# Patient Record
Sex: FEMALE
[De-identification: names, ages, dates, MRNs, and addresses within clinical notes are randomized per-mention and may not be internally consistent; named-entity substitution may affect disease eponyms.]

---

## 2018-02-12 ENCOUNTER — TRANSCRIPTION ENCOUNTER (OUTPATIENT)
Age: 38
End: 2018-02-12

## 2021-07-22 ENCOUNTER — TRANSCRIPTION ENCOUNTER (OUTPATIENT)
Age: 41
End: 2021-07-22

## 2021-08-01 ENCOUNTER — TRANSCRIPTION ENCOUNTER (OUTPATIENT)
Age: 41
End: 2021-08-01

## 2021-08-20 ENCOUNTER — TRANSCRIPTION ENCOUNTER (OUTPATIENT)
Age: 41
End: 2021-08-20

## 2021-12-31 ENCOUNTER — TRANSCRIPTION ENCOUNTER (OUTPATIENT)
Age: 41
End: 2021-12-31

## 2022-03-27 ENCOUNTER — INPATIENT (INPATIENT)
Facility: HOSPITAL | Age: 42
LOS: 0 days | Discharge: HOME | DRG: 176 | End: 2022-03-28
Attending: HOSPITALIST | Admitting: HOSPITALIST
Payer: COMMERCIAL

## 2022-03-27 ENCOUNTER — TRANSCRIPTION ENCOUNTER (OUTPATIENT)
Age: 42
End: 2022-03-27

## 2022-03-27 VITALS
HEART RATE: 104 BPM | OXYGEN SATURATION: 99 % | HEIGHT: 68 IN | WEIGHT: 158.73 LBS | DIASTOLIC BLOOD PRESSURE: 67 MMHG | TEMPERATURE: 98 F | RESPIRATION RATE: 24 BRPM | SYSTOLIC BLOOD PRESSURE: 139 MMHG

## 2022-03-27 DIAGNOSIS — Z90.49 ACQUIRED ABSENCE OF OTHER SPECIFIED PARTS OF DIGESTIVE TRACT: Chronic | ICD-10-CM

## 2022-03-27 DIAGNOSIS — I26.99 OTHER PULMONARY EMBOLISM WITHOUT ACUTE COR PULMONALE: ICD-10-CM

## 2022-03-27 LAB
ALBUMIN SERPL ELPH-MCNC: 3.4 G/DL — LOW (ref 3.5–5)
ALP SERPL-CCNC: 64 U/L — SIGNIFICANT CHANGE UP (ref 40–120)
ALT FLD-CCNC: 19 U/L DA — SIGNIFICANT CHANGE UP (ref 10–60)
AMPHET UR-MCNC: NEGATIVE — SIGNIFICANT CHANGE UP
ANION GAP SERPL CALC-SCNC: 5 MMOL/L — SIGNIFICANT CHANGE UP (ref 5–17)
APPEARANCE UR: CLEAR — SIGNIFICANT CHANGE UP
AST SERPL-CCNC: 17 U/L — SIGNIFICANT CHANGE UP (ref 10–40)
BACTERIA # UR AUTO: ABNORMAL /HPF
BARBITURATES UR SCN-MCNC: NEGATIVE — SIGNIFICANT CHANGE UP
BASOPHILS # BLD AUTO: 0.02 K/UL — SIGNIFICANT CHANGE UP (ref 0–0.2)
BASOPHILS NFR BLD AUTO: 0.5 % — SIGNIFICANT CHANGE UP (ref 0–2)
BENZODIAZ UR-MCNC: NEGATIVE — SIGNIFICANT CHANGE UP
BILIRUB SERPL-MCNC: 0.6 MG/DL — SIGNIFICANT CHANGE UP (ref 0.2–1.2)
BILIRUB UR-MCNC: NEGATIVE — SIGNIFICANT CHANGE UP
BUN SERPL-MCNC: 8 MG/DL — SIGNIFICANT CHANGE UP (ref 7–18)
CALCIUM SERPL-MCNC: 9 MG/DL — SIGNIFICANT CHANGE UP (ref 8.4–10.5)
CHLORIDE SERPL-SCNC: 105 MMOL/L — SIGNIFICANT CHANGE UP (ref 96–108)
CK SERPL-CCNC: 35 U/L — SIGNIFICANT CHANGE UP (ref 21–215)
CO2 SERPL-SCNC: 26 MMOL/L — SIGNIFICANT CHANGE UP (ref 22–31)
COCAINE METAB.OTHER UR-MCNC: NEGATIVE — SIGNIFICANT CHANGE UP
COLOR SPEC: YELLOW — SIGNIFICANT CHANGE UP
CREAT SERPL-MCNC: 0.78 MG/DL — SIGNIFICANT CHANGE UP (ref 0.5–1.3)
D DIMER BLD IA.RAPID-MCNC: 1057 NG/ML DDU — HIGH
DIFF PNL FLD: ABNORMAL
EGFR: 98 ML/MIN/1.73M2 — SIGNIFICANT CHANGE UP
EOSINOPHIL # BLD AUTO: 0.11 K/UL — SIGNIFICANT CHANGE UP (ref 0–0.5)
EOSINOPHIL NFR BLD AUTO: 2.5 % — SIGNIFICANT CHANGE UP (ref 0–6)
EPI CELLS # UR: ABNORMAL /HPF
GLUCOSE SERPL-MCNC: 89 MG/DL — SIGNIFICANT CHANGE UP (ref 70–99)
GLUCOSE UR QL: NEGATIVE — SIGNIFICANT CHANGE UP
HCG SERPL-ACNC: <1 MIU/ML — SIGNIFICANT CHANGE UP
HCT VFR BLD CALC: 35.6 % — SIGNIFICANT CHANGE UP (ref 34.5–45)
HGB BLD-MCNC: 12 G/DL — SIGNIFICANT CHANGE UP (ref 11.5–15.5)
IMM GRANULOCYTES NFR BLD AUTO: 0.2 % — SIGNIFICANT CHANGE UP (ref 0–1.5)
KETONES UR-MCNC: NEGATIVE — SIGNIFICANT CHANGE UP
LEUKOCYTE ESTERASE UR-ACNC: NEGATIVE — SIGNIFICANT CHANGE UP
LYMPHOCYTES # BLD AUTO: 1.15 K/UL — SIGNIFICANT CHANGE UP (ref 1–3.3)
LYMPHOCYTES # BLD AUTO: 26 % — SIGNIFICANT CHANGE UP (ref 13–44)
MCHC RBC-ENTMCNC: 29.6 PG — SIGNIFICANT CHANGE UP (ref 27–34)
MCHC RBC-ENTMCNC: 33.7 GM/DL — SIGNIFICANT CHANGE UP (ref 32–36)
MCV RBC AUTO: 87.9 FL — SIGNIFICANT CHANGE UP (ref 80–100)
METHADONE UR-MCNC: NEGATIVE — SIGNIFICANT CHANGE UP
MONOCYTES # BLD AUTO: 0.33 K/UL — SIGNIFICANT CHANGE UP (ref 0–0.9)
MONOCYTES NFR BLD AUTO: 7.4 % — SIGNIFICANT CHANGE UP (ref 2–14)
NEUTROPHILS # BLD AUTO: 2.81 K/UL — SIGNIFICANT CHANGE UP (ref 1.8–7.4)
NEUTROPHILS NFR BLD AUTO: 63.4 % — SIGNIFICANT CHANGE UP (ref 43–77)
NITRITE UR-MCNC: POSITIVE
NRBC # BLD: 0 /100 WBCS — SIGNIFICANT CHANGE UP (ref 0–0)
OPIATES UR-MCNC: NEGATIVE — SIGNIFICANT CHANGE UP
PCP SPEC-MCNC: SIGNIFICANT CHANGE UP
PCP UR-MCNC: NEGATIVE — SIGNIFICANT CHANGE UP
PH UR: 6 — SIGNIFICANT CHANGE UP (ref 5–8)
PLATELET # BLD AUTO: 172 K/UL — SIGNIFICANT CHANGE UP (ref 150–400)
POTASSIUM SERPL-MCNC: 3.7 MMOL/L — SIGNIFICANT CHANGE UP (ref 3.5–5.3)
POTASSIUM SERPL-SCNC: 3.7 MMOL/L — SIGNIFICANT CHANGE UP (ref 3.5–5.3)
PROT SERPL-MCNC: 8 G/DL — SIGNIFICANT CHANGE UP (ref 6–8.3)
PROT UR-MCNC: NEGATIVE — SIGNIFICANT CHANGE UP
RBC # BLD: 4.05 M/UL — SIGNIFICANT CHANGE UP (ref 3.8–5.2)
RBC # FLD: 12 % — SIGNIFICANT CHANGE UP (ref 10.3–14.5)
RBC CASTS # UR COMP ASSIST: ABNORMAL /HPF (ref 0–2)
SARS-COV-2 RNA SPEC QL NAA+PROBE: SIGNIFICANT CHANGE UP
SODIUM SERPL-SCNC: 136 MMOL/L — SIGNIFICANT CHANGE UP (ref 135–145)
SP GR SPEC: 1.01 — SIGNIFICANT CHANGE UP (ref 1.01–1.02)
T4 AB SER-ACNC: 15.1 UG/DL — HIGH (ref 4.6–12)
THC UR QL: NEGATIVE — SIGNIFICANT CHANGE UP
TROPONIN I, HIGH SENSITIVITY RESULT: <3 NG/L — SIGNIFICANT CHANGE UP
TSH SERPL-MCNC: 1.53 UU/ML — SIGNIFICANT CHANGE UP (ref 0.34–4.82)
UROBILINOGEN FLD QL: NEGATIVE — SIGNIFICANT CHANGE UP
WBC # BLD: 4.43 K/UL — SIGNIFICANT CHANGE UP (ref 3.8–10.5)
WBC # FLD AUTO: 4.43 K/UL — SIGNIFICANT CHANGE UP (ref 3.8–10.5)
WBC UR QL: SIGNIFICANT CHANGE UP /HPF (ref 0–5)

## 2022-03-27 PROCEDURE — 99285 EMERGENCY DEPT VISIT HI MDM: CPT

## 2022-03-27 PROCEDURE — 99223 1ST HOSP IP/OBS HIGH 75: CPT

## 2022-03-27 PROCEDURE — 71275 CT ANGIOGRAPHY CHEST: CPT | Mod: 26,MA

## 2022-03-27 PROCEDURE — 71045 X-RAY EXAM CHEST 1 VIEW: CPT | Mod: 26

## 2022-03-27 RX ORDER — ENOXAPARIN SODIUM 100 MG/ML
80 INJECTION SUBCUTANEOUS ONCE
Refills: 0 | Status: COMPLETED | OUTPATIENT
Start: 2022-03-27 | End: 2022-03-27

## 2022-03-27 RX ORDER — ENOXAPARIN SODIUM 100 MG/ML
80 INJECTION SUBCUTANEOUS EVERY 12 HOURS
Refills: 0 | Status: DISCONTINUED | OUTPATIENT
Start: 2022-03-27 | End: 2022-03-27

## 2022-03-27 RX ORDER — SODIUM CHLORIDE 9 MG/ML
1000 INJECTION INTRAMUSCULAR; INTRAVENOUS; SUBCUTANEOUS
Refills: 0 | Status: DISCONTINUED | OUTPATIENT
Start: 2022-03-27 | End: 2022-03-27

## 2022-03-27 RX ORDER — CEFTRIAXONE 500 MG/1
1000 INJECTION, POWDER, FOR SOLUTION INTRAMUSCULAR; INTRAVENOUS ONCE
Refills: 0 | Status: COMPLETED | OUTPATIENT
Start: 2022-03-27 | End: 2022-03-27

## 2022-03-27 RX ORDER — KETOROLAC TROMETHAMINE 30 MG/ML
30 SYRINGE (ML) INJECTION ONCE
Refills: 0 | Status: DISCONTINUED | OUTPATIENT
Start: 2022-03-27 | End: 2022-03-27

## 2022-03-27 RX ADMIN — ENOXAPARIN SODIUM 80 MILLIGRAM(S): 100 INJECTION SUBCUTANEOUS at 22:17

## 2022-03-27 RX ADMIN — CEFTRIAXONE 100 MILLIGRAM(S): 500 INJECTION, POWDER, FOR SOLUTION INTRAMUSCULAR; INTRAVENOUS at 22:17

## 2022-03-27 RX ADMIN — Medication 30 MILLIGRAM(S): at 18:29

## 2022-03-27 RX ADMIN — SODIUM CHLORIDE 200 MILLILITER(S): 9 INJECTION INTRAMUSCULAR; INTRAVENOUS; SUBCUTANEOUS at 22:17

## 2022-03-27 NOTE — ED ADULT NURSE NOTE - OBJECTIVE STATEMENT
patient AOx3, calm, cooperative, in no acute distress, reports intermittent shortness of breath, palpitations, and right side neck and flank pain. No chest pain at this time, no difficulty breathing.

## 2022-03-27 NOTE — H&P ADULT - NSHPPHYSICALEXAM_GEN_ALL_CORE
Vital Signs Last 24 Hrs  T(C): 36.8 (27 Mar 2022 19:22), Max: 36.8 (27 Mar 2022 19:22)  T(F): 98.2 (27 Mar 2022 19:22), Max: 98.2 (27 Mar 2022 19:22)  HR: 79 (27 Mar 2022 19:22) (79 - 104)  BP: 125/77 (27 Mar 2022 19:22) (125/77 - 139/67)  BP(mean): --  RR: 18 (27 Mar 2022 19:22) (18 - 24)  SpO2: 100% (27 Mar 2022 19:22) (99% - 100%)    GENERAL: NAD, speaks in full sentences, becomes SOB when speaking  HEAD:  Atraumatic, Normocephalic  EYES: EOMI, PERRLA, conjunctiva and sclera clear  NECK: Supple, No JVD  CHEST/LUNG: Pt seen holding her right side,. mildly decreased breath sounds on right  HEART: Regular rate and rhythm; No murmurs;   ABDOMEN: Soft, Nontender, Nondistended; Bowel sounds present; No guarding  EXTREMITIES:  2+ Peripheral Pulses, No cyanosis or edema  PSYCH:  Normal Affect  NEUROLOGY: non-focal, AAO X 3. Strength is 5/5. no sensory loss.  SKIN: No rashes or lesions

## 2022-03-27 NOTE — ED PROVIDER NOTE - CLINICAL SUMMARY MEDICAL DECISION MAKING FREE TEXT BOX
pt with Rt sided back pain, coughing, sob, concern for COVID, also PE, FH of coagulopathy, also pt's on BCP, will get labs, d-dimer, unlikely CAD.

## 2022-03-27 NOTE — ED PROVIDER NOTE - CONSTITUTIONAL DISTRESS
Patient has a concern , she is having chemo tomorrow and she wondering if her levels are good for her to have the treatment MILD

## 2022-03-27 NOTE — H&P ADULT - PROBLEM SELECTOR PLAN 1
Pt p/w SOB, chest pain, and cough for 4 days  Pt had COVID in december with only mild symptoms  Father and sister got blood clot from COVID vaccine  Pt on combined estrogen/progesteron OCP  No hx of recent surgery or cancer  CTA showed b/l subsegmental pulmonary embolism, RLL infarct  No sign of right heart strain  Pt stable on RA  EKG NSR @ 84 bpm    Admit to tele  f/u echo to check for right heart strain  FD lovenox for now, transition to DOAC prior to discharge  Tylenol for pain  Patient counseled on alternate forms of birth control  Primary with family hx of VTE after COVID vaccine, primary team to consider hypercoagulable workup  Cardio consult, Dr. Jung Pt p/w SOB, chest pain, and cough for 4 days  Pt had COVID in december with only mild symptoms  Father and sister got blood clot from COVID vaccine  Pt on combined estrogen/progesteron OCP  No hx of recent surgery or cancer  CTA showed b/l subsegmental pulmonary embolism, RLL infarct  No sign of right heart strain  Pt stable on RA  EKG NSR @ 84 bpm    Admit to tele  f/u echo to check for right heart strain  FD lovenox for now, transition to DOAC prior to discharge  Tylenol for pain  Patient counseled on alternate forms of birth control  Primary with family hx of VTE after COVID vaccine, primary team to consider hypercoagulable workup  QMA consulted  Cardio consult, Dr. Jung Pt p/w SOB, chest pain, and cough for 4 days  Pt had COVID in december with only mild symptoms  Father and sister got blood clot from COVID vaccine  Pt on combined estrogen/progesteron OCP  No hx of recent surgery or cancer  CTA showed b/l subsegmental pulmonary embolism, RLL infarct  No sign of right heart strain  Pt stable on RA  EKG NSR @ 84 bpm    Admit to tele  f/u echo to check for right heart strain  FD lovenox for now, transition to DOAC prior to discharge  Tylenol for pain  Patient counseled on alternate forms of birth control  Family hx of VTE after COVID vaccine, primary team to consider hypercoagulable workup  QMA consulted  Cardio consult, Dr. Jung Pt p/w SOB, chest pain, and cough for 4 days  Pt had COVID in december with only mild symptoms  Father and sister got blood clot from COVID vaccine  Pt on combined estrogen/progesteron OCP  No hx of recent surgery or cancer  CTA showed b/l subsegmental pulmonary embolism, RLL infarct  No sign of right heart strain  Pt stable on RA  EKG NSR @ 84 bpm    Admit to tele  f/u echo to check for right heart strain  FD lovenox for now, transition to DOAC prior to discharge  Patient counseled on alternate forms of birth control  Family hx of VTE after COVID vaccine, primary team to consider hypercoagulable workup  QMA consulted  Cardio consult, Dr. Jung Pt p/w SOB, chest pain, and cough for 4 days  Pt had COVID in december with only mild symptoms  Father and sister got blood clot from COVID vaccine  Pt on combined estrogen/progesteron OCP  No hx of recent surgery or cancer  CTA showed b/l subsegmental pulmonary embolism, RLL infarct  No sign of right heart strain  Pt stable on RA  EKG NSR @ 84 bpm    Admit to tele  f/u echo to check for right heart strain  FD lovenox for now, transition to DOAC prior to discharge  Patient counseled on alternate forms of birth control  Family hx of VTE after COVID vaccine, primary team to consider hypercoagulable workup  QMA consulted  Cardio consult, Dr. Correa Pt p/w SOB, chest pain, and cough for 4 days  Pt had COVID in december with only mild symptoms  Father and sister got blood clot from COVID vaccine ? ( might have family hx of clotting disorder)  Pt on combined estrogen/progesteron OCP  No hx of recent surgery or cancer  CTA showed b/l subsegmental pulmonary embolism, RLL infarct  No sign of right heart strain  Pt stable on RA  EKG NSR @ 84 bpm  Admit to tele  f/u echo to check for right heart strain  FD lovenox for now, transition to DOAC prior to discharge  Patient counseled on alternate forms of birth control  Family hx of VTE after COVID vaccine, primary team to consider hypercoagulable workup  QMA consulted  Cardio consult, Dr. Correa Pt p/w SOB, chest pain, and cough for 4 days  Pt had COVID in december with only mild symptoms  Father and sister got blood clot from COVID vaccine ? ( might have family hx of clotting disorder)  Pt on combined estrogen/progesteron OCP  No hx of recent surgery or cancer  CTA showed b/l subsegmental pulmonary embolism, RLL infarct  No sign of right heart strain  Pt stable on RA  EKG NSR @ 84 bpm    f/u echo to check for right heart strain  FD lovenox for now, transition to DOAC prior to discharge  Patient counseled on alternate forms of birth control  Family hx of VTE after COVID vaccine, primary team to consider hypercoagulable workup  QMA consulted

## 2022-03-27 NOTE — H&P ADULT - HISTORY OF PRESENT ILLNESS
Pt is a 41 y.o. female with PMH of PCOS, and PSHx of appendectomy and cholecystectomy is presenting with 4 days of shortness of breath, chest pain and cough. She describes right sided chest pain and right sided back pain, worse when laying flat and taking a deep breath. She said her oxygen levels was 95% and her HR was over 100. She is unvaccinated, but had COVID 12/21 which did not require hospitalization. She takes birth control pills. Claims her father and sister have history of blood clots.    In ED: D-dimer 1057, Trop negative, EKG, CTA showed bilateral lower lobe and lingular subsegmental pulmonary emboli, 99% on RA   Pt is a 41 y.o. female with PMH of PCOS, and PSHx of appendectomy and cholecystectomy is presenting with 4 days of shortness of breath, chest pain and cough. She describes right sided chest pain and right sided back pain which is worse on exertion, when laying flat and on inspiration. She said at home her O2 level was 95% and her HR was over 100. She is unvaccinated, but had COVID in 12/21 which did not require hospitalization. She takes combined birth control pills. Claims her father and sister have history of blood clots after getting COVID vaccine.    In ED: D-dimer 1057, Trop negative, EKG shows NSR, CTA showed bilateral lower lobe and lingular subsegmental pulmonary emboli and RLL infarct, 99% on RA   Pt is a 41 y.o. female with PMH of PCOS, and PSHx of appendectomy and cholecystectomy is presenting with 4 days of shortness of breath, chest pain and cough. She describes right sided chest pain and right sided back pain which is worse on exertion, when laying flat and on inspiration. She said at home her O2 level was 95% and her HR was over 100. She is unvaccinated, but had COVID in 12/21 which did not require hospitalization. She takes combined birth control pills. Claims her father and sister have history of blood clots after getting COVID vaccine.    In ED: D-dimer 1057, Trop negative, EKG shows NSR, CTA showed bilateral lower lobe and lingular subsegmental pulmonary emboli and RLL infarct, 99% on RA    Pharmacy: [id number] [facility]  No PCP

## 2022-03-27 NOTE — ED PROVIDER NOTE - CARE PLAN
Principal Discharge DX:	Pulmonary embolism  Secondary Diagnosis:	Pulmonary infarct  Secondary Diagnosis:	Acute pyelonephritis   1

## 2022-03-27 NOTE — H&P ADULT - PROBLEM SELECTOR PLAN 2
Pt with hx of PCOS  c/w spironolactone Pt with hx of PCOS  Hold spironolactone for now, can resume Pt with hx of PCOS  Hold spironolactone for now, can resume once blood pressure is better.

## 2022-03-27 NOTE — H&P ADULT - ATTENDING COMMENTS
Pt seen and examined.  Case discussed with MAR.  41 year old woman with PMH of PCOS on OCP who presents with 4 days of intermittent SOB, chest tightness and pleuritic chest pain, dry cough all worse with exertion.     Vital Signs Last 24 Hrs  T(C): 36.8 (27 Mar 2022 19:22), Max: 36.8 (27 Mar 2022 19:22)  T(F): 98.2 (27 Mar 2022 19:22), Max: 98.2 (27 Mar 2022 19:22)  HR: 79 (27 Mar 2022 19:22) (79 - 104)  BP: 125/77 (27 Mar 2022 19:22) (125/77 - 139/67)  RR: 18 (27 Mar 2022 19:22) (18 - 24)  SpO2: 100% (27 Mar 2022 19:22) (99% - 100%)                          12.0   4.43  )-----------( 172      ( 27 Mar 2022 17:06 )             35.6     D-dimer - 1057    03-27    136  |  105  |  8   ---------------------<  89  3.7   |  26  |  0.78    Ca    9.0      27 Mar 2022 17:06    TPro  8.0  /  Alb  3.4<L>  /  TBili  0.6  /  DBili  x   /  AST  17  /  ALT  19  /  AlkPhos  64  03-27    T4 - 15.1    CTA chest   Right lower lobe dependent ground-glass opacity compatible with a pulmonary infarct.  VESSELS: Right lower lobar through subsegmental pulmonary emboli.   Lingular and left lower lobar through subsegmental pulmonary emboli..      Impression  - Acute B/L pulmonary embolism - subsegmental    Provoked likely from OCP use but recent incidence of venous thromboembolism in first degree relatives suggest a need for a thrombophilia work up    Plan   - Admit to medicine   - Full dose anticoagulation with LMWH -Lovenox 1mg/kg q 12 hourly   - ECHO 2D  - Change to oral anticoagulation in AM   - Hematology consult and follow up in clinic for thrombophilia work up  - Hold OCP Pt seen and examined.  Case discussed with MAR.  41 year old woman with PMH of PCOS on OCP who presents with 4 days of intermittent SOB, chest tightness and pleuritic chest pain, dry cough all worse with exertion.     Vital Signs Last 24 Hrs  T(C): 36.8 (27 Mar 2022 19:22), Max: 36.8 (27 Mar 2022 19:22)  T(F): 98.2 (27 Mar 2022 19:22), Max: 98.2 (27 Mar 2022 19:22)  HR: 79 (27 Mar 2022 19:22) (79 - 104)  BP: 125/77 (27 Mar 2022 19:22) (125/77 - 139/67)  RR: 18 (27 Mar 2022 19:22) (18 - 24)  SpO2: 100% (27 Mar 2022 19:22) (99% - 100%)                          12.0   4.43  )-----------( 172      ( 27 Mar 2022 17:06 )             35.6     D-dimer - 1057    03-27    136  |  105  |  8   ---------------------<  89  3.7   |  26  |  0.78    Ca    9.0      27 Mar 2022 17:06    TPro  8.0  /  Alb  3.4<L>  /  TBili  0.6  /  DBili  x   /  AST  17  /  ALT  19  /  AlkPhos  64  03-27    T4 - 15.1    CTA chest   Right lower lobe dependent ground-glass opacity compatible with a pulmonary infarct.  VESSELS: Right lower lobar through subsegmental pulmonary emboli.   Lingular and left lower lobar through subsegmental pulmonary emboli..      Impression  - Acute B/L pulmonary embolism - subsegmental    Provoked likely from OCP use but recent incidence of venous thromboembolism in first degree relatives ( presumably related to vaccine use) suggest a need for a thrombophilia work up    Plan   - Admit to medicine   - Full dose anticoagulation with LMWH -Lovenox 1mg/kg q 12 hourly   - ECHO 2D  - Change to oral anticoagulation in AM   - Hematology consult and follow up in clinic for thrombophilia work up  - Hold OCP

## 2022-03-27 NOTE — ED PROVIDER NOTE - OBJECTIVE STATEMENT
41 y.o. female LMP 3/14, pt claims sob since Wed. night, on & off Rt sided neck pain radiated down the back, worse when lying down, nausea, constant mid chest tightness, coughing since Thursday, dry cough, pain worse with movement/deep inspiration, pt's not vaccinated for COVID, pt's is on BCP. no fever, vomiting, Pt noted her pulse ox was 94 on Fri.  Today , pulse ox 95%.  Pt took Ibuprofen with relief, denies injury

## 2022-03-27 NOTE — H&P ADULT - ASSESSMENT
Pt is a 41 y.o. female with PMH of PCOS, and PSHx of appendectomy and cholecystectomy is presenting with 4 days of shortness of breath, chest pain and cough. Admit for PE

## 2022-03-27 NOTE — H&P ADULT - NSHPREVIEWOFSYSTEMS_GEN_ALL_CORE
CONSTITUTIONAL: No fever, weight loss, or fatigue  EYES: No eye pain, visual disturbances, or discharge  ENT:  No difficulty hearing, tinnitus, vertigo; No sinus or throat pain  NECK: No pain or stiffness  RESPIRATORY: +dry cough, +SOB  CARDIOVASCULAR: +right chest pain, +palpitations, passing out, dizziness, or leg swelling  GASTROINTESTINAL: No abdominal or epigastric pain. No nausea, vomiting, or hematemesis; No diarrhea or constipation. No melena or hematochezia.  GENITOURINARY: No dysuria, frequency, hematuria, or incontinence  NEUROLOGICAL: No headaches, memory loss, loss of strength, numbness, or tremors  SKIN: No itching, burning, rashes, or lesions   LYMPH Nodes: No enlarged glands  ENDOCRINE: No heat or cold intolerance; No hair loss  MUSCULOSKELETAL: No joint pain or swelling; No muscle, back, No extremity pain  PSYCHIATRIC: No depression, anxiety, mood swings, or difficulty sleeping  HEME/LYMPH: No easy bruising, or bleeding gums  ALLERGY AND IMMUNOLOGIC: No hives or eczema

## 2022-03-27 NOTE — ED PROVIDER NOTE - MUSCULOSKELETAL, MLM
Spine appears normal, range of motion is not limited, Rt sided back- mid back-tenderness to palp., ?Rt CVAT, no calves tenderness,

## 2022-03-28 ENCOUNTER — TRANSCRIPTION ENCOUNTER (OUTPATIENT)
Age: 42
End: 2022-03-28

## 2022-03-28 VITALS
SYSTOLIC BLOOD PRESSURE: 124 MMHG | DIASTOLIC BLOOD PRESSURE: 81 MMHG | RESPIRATION RATE: 18 BRPM | HEART RATE: 78 BPM | OXYGEN SATURATION: 98 % | TEMPERATURE: 97 F

## 2022-03-28 DIAGNOSIS — I26.99 OTHER PULMONARY EMBOLISM WITHOUT ACUTE COR PULMONALE: ICD-10-CM

## 2022-03-28 DIAGNOSIS — E28.2 POLYCYSTIC OVARIAN SYNDROME: ICD-10-CM

## 2022-03-28 DIAGNOSIS — Z29.9 ENCOUNTER FOR PROPHYLACTIC MEASURES, UNSPECIFIED: ICD-10-CM

## 2022-03-28 LAB
ANION GAP SERPL CALC-SCNC: 4 MMOL/L — LOW (ref 5–17)
BUN SERPL-MCNC: 7 MG/DL — SIGNIFICANT CHANGE UP (ref 7–18)
CALCIUM SERPL-MCNC: 8.1 MG/DL — LOW (ref 8.4–10.5)
CHLORIDE SERPL-SCNC: 110 MMOL/L — HIGH (ref 96–108)
CO2 SERPL-SCNC: 27 MMOL/L — SIGNIFICANT CHANGE UP (ref 22–31)
CREAT SERPL-MCNC: 0.66 MG/DL — SIGNIFICANT CHANGE UP (ref 0.5–1.3)
EGFR: 113 ML/MIN/1.73M2 — SIGNIFICANT CHANGE UP
GLUCOSE SERPL-MCNC: 90 MG/DL — SIGNIFICANT CHANGE UP (ref 70–99)
HCT VFR BLD CALC: 32.2 % — LOW (ref 34.5–45)
HGB BLD-MCNC: 10.5 G/DL — LOW (ref 11.5–15.5)
MCHC RBC-ENTMCNC: 29.3 PG — SIGNIFICANT CHANGE UP (ref 27–34)
MCHC RBC-ENTMCNC: 32.6 GM/DL — SIGNIFICANT CHANGE UP (ref 32–36)
MCV RBC AUTO: 89.9 FL — SIGNIFICANT CHANGE UP (ref 80–100)
NRBC # BLD: 0 /100 WBCS — SIGNIFICANT CHANGE UP (ref 0–0)
PLATELET # BLD AUTO: 153 K/UL — SIGNIFICANT CHANGE UP (ref 150–400)
POTASSIUM SERPL-MCNC: 4 MMOL/L — SIGNIFICANT CHANGE UP (ref 3.5–5.3)
POTASSIUM SERPL-SCNC: 4 MMOL/L — SIGNIFICANT CHANGE UP (ref 3.5–5.3)
RBC # BLD: 3.58 M/UL — LOW (ref 3.8–5.2)
RBC # FLD: 12.2 % — SIGNIFICANT CHANGE UP (ref 10.3–14.5)
SODIUM SERPL-SCNC: 141 MMOL/L — SIGNIFICANT CHANGE UP (ref 135–145)
WBC # BLD: 3.46 K/UL — LOW (ref 3.8–10.5)
WBC # FLD AUTO: 3.46 K/UL — LOW (ref 3.8–10.5)

## 2022-03-28 PROCEDURE — 80307 DRUG TEST PRSMV CHEM ANLYZR: CPT

## 2022-03-28 PROCEDURE — 87635 SARS-COV-2 COVID-19 AMP PRB: CPT

## 2022-03-28 PROCEDURE — 36415 COLL VENOUS BLD VENIPUNCTURE: CPT

## 2022-03-28 PROCEDURE — 82550 ASSAY OF CK (CPK): CPT

## 2022-03-28 PROCEDURE — 85379 FIBRIN DEGRADATION QUANT: CPT

## 2022-03-28 PROCEDURE — 80053 COMPREHEN METABOLIC PANEL: CPT

## 2022-03-28 PROCEDURE — 93005 ELECTROCARDIOGRAM TRACING: CPT

## 2022-03-28 PROCEDURE — 71275 CT ANGIOGRAPHY CHEST: CPT | Mod: MA

## 2022-03-28 PROCEDURE — 80048 BASIC METABOLIC PNL TOTAL CA: CPT

## 2022-03-28 PROCEDURE — 93306 TTE W/DOPPLER COMPLETE: CPT

## 2022-03-28 PROCEDURE — 81001 URINALYSIS AUTO W/SCOPE: CPT

## 2022-03-28 PROCEDURE — 87186 SC STD MICRODIL/AGAR DIL: CPT

## 2022-03-28 PROCEDURE — 84443 ASSAY THYROID STIM HORMONE: CPT

## 2022-03-28 PROCEDURE — 99285 EMERGENCY DEPT VISIT HI MDM: CPT

## 2022-03-28 PROCEDURE — 84484 ASSAY OF TROPONIN QUANT: CPT

## 2022-03-28 PROCEDURE — 71045 X-RAY EXAM CHEST 1 VIEW: CPT

## 2022-03-28 PROCEDURE — 84436 ASSAY OF TOTAL THYROXINE: CPT

## 2022-03-28 PROCEDURE — 96374 THER/PROPH/DIAG INJ IV PUSH: CPT

## 2022-03-28 PROCEDURE — 84702 CHORIONIC GONADOTROPIN TEST: CPT

## 2022-03-28 PROCEDURE — 99239 HOSP IP/OBS DSCHRG MGMT >30: CPT

## 2022-03-28 PROCEDURE — 87086 URINE CULTURE/COLONY COUNT: CPT

## 2022-03-28 PROCEDURE — 85027 COMPLETE CBC AUTOMATED: CPT

## 2022-03-28 PROCEDURE — 93306 TTE W/DOPPLER COMPLETE: CPT | Mod: 26

## 2022-03-28 PROCEDURE — 85025 COMPLETE CBC W/AUTO DIFF WBC: CPT

## 2022-03-28 RX ORDER — ENOXAPARIN SODIUM 100 MG/ML
70 INJECTION SUBCUTANEOUS EVERY 12 HOURS
Refills: 0 | Status: DISCONTINUED | OUTPATIENT
Start: 2022-03-28 | End: 2022-03-28

## 2022-03-28 RX ORDER — ACETAMINOPHEN 500 MG
1000 TABLET ORAL ONCE
Refills: 0 | Status: COMPLETED | OUTPATIENT
Start: 2022-03-28 | End: 2022-03-28

## 2022-03-28 RX ORDER — APIXABAN 2.5 MG/1
2 TABLET, FILM COATED ORAL
Qty: 28 | Refills: 0
Start: 2022-03-28 | End: 2022-04-03

## 2022-03-28 RX ORDER — SPIRONOLACTONE 25 MG/1
25 TABLET, FILM COATED ORAL DAILY
Refills: 0 | Status: DISCONTINUED | OUTPATIENT
Start: 2022-03-28 | End: 2022-03-28

## 2022-03-28 RX ADMIN — Medication 1000 MILLIGRAM(S): at 15:53

## 2022-03-28 RX ADMIN — ENOXAPARIN SODIUM 70 MILLIGRAM(S): 100 INJECTION SUBCUTANEOUS at 10:19

## 2022-03-28 RX ADMIN — Medication 1000 MILLIGRAM(S): at 15:23

## 2022-03-28 NOTE — CONSULT NOTE ADULT - SUBJECTIVE AND OBJECTIVE BOX
Patient is a 41y old  Female who presents with a chief complaint of SOB (28 Mar 2022 13:23)      SUBJECTIVE / OVERNIGHT EVENTS:      MEDICATIONS  (STANDING):  enoxaparin Injectable 70 milliGRAM(s) SubCutaneous every 12 hours    MEDICATIONS  (PRN):  benzonatate 100 milliGRAM(s) Oral three times a day PRN Cough      PHYSICAL EXAM:  Vital Signs Last 24 Hrs  T(C): 36.6 (28 Mar 2022 08:01), Max: 36.8 (27 Mar 2022 19:22)  T(F): 97.8 (28 Mar 2022 08:01), Max: 98.2 (27 Mar 2022 19:22)  HR: 79 (28 Mar 2022 11:14) (62 - 104)  BP: 123/79 (28 Mar 2022 11:14) (109/67 - 145/75)  BP(mean): --  RR: 16 (28 Mar 2022 11:14) (16 - 24)  SpO2: 98% (28 Mar 2022 11:14) (94% - 100%)      LABS:                        10.5   3.46  )-----------( 153      ( 28 Mar 2022 05:24 )             32.2     03-28    141  |  110<H>  |  7   ----------------------------<  90  4.0   |  27  |  0.66    Ca    8.1<L>      28 Mar 2022 05:24    TPro  8.0  /  Alb  3.4<L>  /  TBili  0.6  /  DBili  x   /  AST  17  /  ALT  19  /  AlkPhos  64  03-27      CARDIAC MARKERS ( 27 Mar 2022 17:06 )  x     / x     / 35 U/L / x     / x          Urinalysis Basic - ( 27 Mar 2022 17:18 )    Color: Yellow / Appearance: Clear / S.015 / pH: x  Gluc: x / Ketone: Negative  / Bili: Negative / Urobili: Negative   Blood: x / Protein: Negative / Nitrite: Positive   Leuk Esterase: Negative / RBC: 5-10 /HPF / WBC 3-5 /HPF   Sq Epi: x / Non Sq Epi: Occasional /HPF / Bacteria: TNTC /HPF        COVID-19 PCR: NotDetec (27 Mar 2022 18:36)      RADIOLOGY & ADDITIONAL TESTS:       Reason for Admission: SOB  History of Present Illness:   Pt is a 41 y.o. female with PMH of PCOS, and PSHx of appendectomy and cholecystectomy is presenting with 4 days of shortness of breath, chest pain and cough. She describes right sided chest pain and right sided back pain which is worse on exertion, when laying flat and on inspiration. She said at home her O2 level was 95% and her HR was over 100. She is unvaccinated, but had COVID in  which did not require hospitalization. She takes combined birth control pills. Claims her father and sister have history of blood clots after getting COVID vaccine.    In ED: D-dimer 1057, Trop negative, EKG shows NSR, CTA showed bilateral lower lobe and lingular subsegmental pulmonary emboli and RLL infarct, 99% on RA    No PCP    REVIEW OF SYSTEMS:    CONSTITUTIONAL: No fever, no loss of appetite. no chills, no weight loss,   EYES: no acute visual disturbances  NECK: No pain or stiffness  RESPIRATORY: + cough; + shortness of breath  CARDIOVASCULAR: No chest pain, no palpitations  GASTROINTESTINAL: No pain. No nausea or vomiting; No diarrhea   NEUROLOGICAL: No headache or numbness, no tremors  MUSCULOSKELETAL: No joint pain, no muscle pain  GENITOURINARY: no dysuria, no frequency, no hesitancy  PSYCHIATRY: no depression, no anxiety  ALL OTHER  ROS negative      Allergies and Intolerances:        Allergies:  	No Known Allergies:     Home Medications:   * Patient Currently Takes Medications as of 28-Mar-2022 00:06 documented in Structured Notes  · 	SPIRONOLACTONE 25MG TAB: Last Dose Taken:    · 	TRI-LO-DEREK DEREK TAB: Last Dose Taken:      .    Patient History:    Past Medical, Past Surgical, and Family History:  PAST MEDICAL HISTORY:  PCOS (polycystic ovarian syndrome).     PAST SURGICAL HISTORY:  History of appendectomy     History of cholecystectomy.     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	No      MEDICATIONS  (STANDING):  enoxaparin Injectable 70 milliGRAM(s) SubCutaneous every 12 hours    MEDICATIONS  (PRN):  benzonatate 100 milliGRAM(s) Oral three times a day PRN Cough      PHYSICAL EXAM:  Vital Signs Last 24 Hrs  T(C): 36.6 (28 Mar 2022 08:01), Max: 36.8 (27 Mar 2022 19:22)  T(F): 97.8 (28 Mar 2022 08:01), Max: 98.2 (27 Mar 2022 19:22)  HR: 79 (28 Mar 2022 11:14) (62 - 104)  BP: 123/79 (28 Mar 2022 11:14) (109/67 - 145/75)  BP(mean): --  RR: 16 (28 Mar 2022 11:14) (16 - 24)  SpO2: 98% (28 Mar 2022 11:14) (94% - 100%)    GEN: NAD; A and O x 3  LUNGS: CTA B/L  HEART: S1 S2  ABDOMEN: soft, non-tender, non-distended, + BS  EXTREMITIES: no edema  NERVOUS SYSTEM:  Awake and alert; no focal neuro deficits      LABS:                        10.5   3.46  )-----------( 153      ( 28 Mar 2022 05:24 )             32.2     03-28    141  |  110<H>  |  7   ----------------------------<  90  4.0   |  27  |  0.66    Ca    8.1<L>      28 Mar 2022 05:24    TPro  8.0  /  Alb  3.4<L>  /  TBili  0.6  /  DBili  x   /  AST  17  /  ALT  19  /  AlkPhos  64  03-27      CARDIAC MARKERS ( 27 Mar 2022 17:06 )  x     / x     / 35 U/L / x     / x          Urinalysis Basic - ( 27 Mar 2022 17:18 )    Color: Yellow / Appearance: Clear / S.015 / pH: x  Gluc: x / Ketone: Negative  / Bili: Negative / Urobili: Negative   Blood: x / Protein: Negative / Nitrite: Positive   Leuk Esterase: Negative / RBC: 5-10 /HPF / WBC 3-5 /HPF   Sq Epi: x / Non Sq Epi: Occasional /HPF / Bacteria: TNTC /HPF        COVID-19 PCR: NotDetec (27 Mar 2022 18:36)      RADIOLOGY & ADDITIONAL TESTS:       Reason for Admission: SOB  History of Present Illness:   Pt is a 41 y.o. female with PMH of PCOS, and PSHx of appendectomy and cholecystectomy is presenting with 4 days of shortness of breath, chest pain and cough. She describes right sided chest pain and right sided back pain which is worse on exertion, when laying flat and on inspiration. She said at home her O2 level was 95% and her HR was over 100. She is unvaccinated, but had COVID in  which did not require hospitalization. She takes combined birth control pills. Claims her father and sister have history of blood clots after getting COVID vaccine.    In ED: D-dimer 1057, Trop negative, EKG shows NSR, CTA showed bilateral lower lobe and lingular subsegmental pulmonary emboli and RLL infarct, 99% on RA    No PCP    REVIEW OF SYSTEMS:    CONSTITUTIONAL: No fever, no loss of appetite. no chills, no weight loss,   EYES: no acute visual disturbances  NECK: No pain or stiffness  RESPIRATORY: + cough; + shortness of breath  CARDIOVASCULAR: No chest pain, no palpitations  GASTROINTESTINAL: No pain. No nausea or vomiting; No diarrhea   NEUROLOGICAL: No headache or numbness, no tremors  MUSCULOSKELETAL: No joint pain, no muscle pain  GENITOURINARY: no dysuria, no frequency, no hesitancy  PSYCHIATRY: no depression, no anxiety  ALL OTHER  ROS negative      Allergies and Intolerances:        Allergies:  	No Known Allergies:     Home Medications:   * Patient Currently Takes Medications as of 28-Mar-2022 00:06 documented in Structured Notes  · 	SPIRONOLACTONE 25MG TAB: Last Dose Taken:    · 	TRI-LO-DEREK DEREK TAB: Last Dose Taken:      .    Patient History:    Past Medical, Past Surgical, and Family History:  PAST MEDICAL HISTORY:  PCOS (polycystic ovarian syndrome).     PAST SURGICAL HISTORY:  History of appendectomy     History of cholecystectomy.     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	No      MEDICATIONS  (STANDING):  enoxaparin Injectable 70 milliGRAM(s) SubCutaneous every 12 hours    MEDICATIONS  (PRN):  benzonatate 100 milliGRAM(s) Oral three times a day PRN Cough      PHYSICAL EXAM:  Vital Signs Last 24 Hrs  T(C): 36.6 (28 Mar 2022 08:01), Max: 36.8 (27 Mar 2022 19:22)  T(F): 97.8 (28 Mar 2022 08:01), Max: 98.2 (27 Mar 2022 19:22)  HR: 79 (28 Mar 2022 11:14) (62 - 104)  BP: 123/79 (28 Mar 2022 11:14) (109/67 - 145/75)  BP(mean): --  RR: 16 (28 Mar 2022 11:14) (16 - 24)  SpO2: 98% (28 Mar 2022 11:14) (94% - 100%)    GEN: NAD; A and O x 3  LUNGS: CTA B/L  HEART: S1 S2  ABDOMEN: soft, non-tender, non-distended, + BS  EXTREMITIES: no edema  NERVOUS SYSTEM:  Awake and alert; no focal neuro deficits      LABS:                        10.5   3.46  )-----------( 153      ( 28 Mar 2022 05:24 )             32.2     03-    141  |  110<H>  |  7   ----------------------------<  90  4.0   |  27  |  0.66    Ca    8.1<L>      28 Mar 2022 05:24    TPro  8.0  /  Alb  3.4<L>  /  TBili  0.6  /  DBili  x   /  AST  17  /  ALT  19  /  AlkPhos  64  03-27      CARDIAC MARKERS ( 27 Mar 2022 17:06 )  x     / x     / 35 U/L / x     / x          Urinalysis Basic - ( 27 Mar 2022 17:18 )    Color: Yellow / Appearance: Clear / S.015 / pH: x  Gluc: x / Ketone: Negative  / Bili: Negative / Urobili: Negative   Blood: x / Protein: Negative / Nitrite: Positive   Leuk Esterase: Negative / RBC: 5-10 /HPF / WBC 3-5 /HPF   Sq Epi: x / Non Sq Epi: Occasional /HPF / Bacteria: TNTC /HPF        COVID-19 PCR: NotDetec (27 Mar 2022 18:36)      RADIOLOGY & ADDITIONAL TESTS:    < from: CT Angio Chest PE Protocol w/ IV Cont (22 @ 20:58) >    IMPRESSION:  Bilateral pulmonary emboli. No evidence for right heart strain.    Right lower lobe pulmonary infarct.    Findings were discussed with Dr. TYRELL MENDEZ [id number] 3/27/2022 9:26   PM by Dr. Joya with read back confirmation.    < end of copied text >  < from: 12 Lead ECG (22 @ 15:54) >  Diagnosis Line Normal sinus rhythm  Possible Left atrial enlargement  Borderline ECG    < end of copied text >

## 2022-03-28 NOTE — DISCHARGE NOTE NURSING/CASE MANAGEMENT/SOCIAL WORK - PATIENT PORTAL LINK FT
You can access the FollowMyHealth Patient Portal offered by [facility] by registering at the following website: [email or web address]. By joining [facility]’s FollowMyHealth portal, you will also be able to view your health information using other applications (apps) compatible with our system.

## 2022-03-28 NOTE — DISCHARGE NOTE NURSING/CASE MANAGEMENT/SOCIAL WORK - NSDCPEFALRISK_GEN_ALL_CORE
For information on Fall & Injury Prevention, visit: [email or web address] OR  [email or web address] OR  https://www.cdc.gov/steadi/patient.html

## 2022-03-28 NOTE — CONSULT NOTE ADULT - ASSESSMENT
complete note to follow   complete note to follow    #B/L PE  p/w SOB, cough x 6 days  Hx Covid December 25, 2021  unvaccinated  on OCP for PCOS for 5 years (irregular menses and dysmenorrhea)  FamHx with Father and sister with VTE, follow with Hematologist in [place], unclear of hypercoag w/u  no recent travel or surgery  elevated D-dimer  CTA shows B/L PE without right heart strain  Rec's:  suspect OCP + prior covid infection vs underlying hypercoag disorder  Lovenox with switch to DOAC  discussed potential SE of bleeding, poss worsening dysmenorrhea  advised to discontinue OCP and recommend she call her Gyn to discuss  hypercoag w/u as outpt    #NCNC Anemia  w/u as outpt    Thank you for the referral. Will continue to monitor the patient.  Please call with any questions [phone]  Above reviewed with Attending Dr. Marco A LEIVA/[facility]  [address] [facility], [address], [place], [place]  [phone]  *Note not finalized until signed by Attending Physician       41 y.o. female with PMH of PCOS, and PSHx of appendectomy and cholecystectomy is presenting with 4 days of shortness of breath, chest pain and cough. She describes right sided chest pain and right sided back pain which is worse on exertion, when laying flat and on inspiration. She said at home her O2 level was 95% and her HR was over 100. She is unvaccinated, but had COVID in 12/21 which did not require hospitalization. She takes combined birth control pills. Claims her father and sister have history of blood clots after getting COVID vaccine.    #B/L PE  p/w SOB, cough x 6 days  Hx Covid December 25, 2021  unvaccinated  on OCP for PCOS for 5 years (irregular menses and dysmenorrhea)  FamHx with Father and sister with VTE, follow with Hematologist in [place], unclear of hypercoag w/u  no recent travel or surgery  3 preg 3 children, ? 1 miscarriage  elevated D-dimer  CTA shows B/L PE without right heart strain  EKG : Normal sinus rhythm. Possible Left atrial enlargement. Borderline ECG  Rec's:  -suspect OCP + prior covid infection and/or underlying hypercoag disorder  -Lovenox with switch to DOAC, Cr wnl  -discussed potential SE of bleeding, poss worsening dysmenorrhea  -advised to discontinue OCP and recommend she call her Gyn to discuss  -hypercoag w/u as outpt  -f/u with Hematologist Dr. Strickland in 1 week to evaluate for tolerance and anemia w/u    #NCNC Anemia  w/u as outpt    Thank you for the referral. Will continue to monitor the patient.  Please call with any questions [phone]  Above reviewed with Attending Dr. Strickland  [facility]  [address] [facility], [address], [place], [place]  [phone]  *Note not finalized until signed by Attending Physician

## 2022-03-28 NOTE — DISCHARGE NOTE PROVIDER - HOSPITAL COURSE
Pt is a 41 y.o. female with PMH of PCOS, and PSHx of appendectomy and cholecystectomy is presenting with 4 days of shortness of breath, chest pain and cough. She describes right sided chest pain and right sided back pain which is worse on exertion, when laying flat and on inspiration. She states her O2 level was 95% and her HR was over 100 at home. She is unvaccinated, but had COVID in 12/21 which did not require hospitalization. She takes combined birth control pills. Claims her father and sister have history of blood clots after getting COVID vaccine.  In ED: D-dimer 1057, Trop negative, EKG shows NSR  CTA showed bilateral lower lobe and lingular subsegmental pulmonary emboli and RLL infarct, 99% on RA  Started full dose Lovenox, changed to DOAC on discharge. Heme/onc [facility] consulted for family hx of clots and outpatient work up for coagulopathy.   Patient is stable on room air.  ECHO resulted    Patient is optimized for discharge home with  outpatient follow up.   Please refer to medical records/progress notes for in depth hospital course.    Pt is a 41 y.o. female with PMH of PCOS, and PSHx of appendectomy and cholecystectomy is presenting with 4 days of shortness of breath, chest pain and cough. She describes right sided chest pain and right sided back pain which is worse on exertion, when laying flat and on inspiration. She states her O2 level was 95% and her HR was over 100 at home. She is unvaccinated, but had COVID in 12/21 which did not require hospitalization. She takes combined birth control pills. Claims her father and sister have history of blood clots after getting COVID vaccine.  In ED: D-dimer 1057, Trop negative, EKG shows NSR  CTA showed bilateral lower lobe and lingular subsegmental pulmonary emboli and RLL infarct, 99% on RA  Started full dose Lovenox, changed to DOAC on discharge. Heme/onc [facility] consulted for family hx of clots and outpatient work up for coagulopathy.   Patient is stable on room air.  ECHO performed, will call pt cell phone for result by attending physician.   Patient is optimized for discharge home with  outpatient follow up.   Please refer to medical records/progress notes for in depth hospital course.    Pt is a 41 y.o. female with PMH of PCOS, and PSHx of appendectomy and cholecystectomy is presenting with 4 days of shortness of breath, chest pain and cough. She describes right sided chest pain and right sided back pain which is worse on exertion, when laying flat and on inspiration. She states her O2 level was 95% and her HR was over 100 at home. She is unvaccinated, but had COVID in 12/21 which did not require hospitalization. She takes combined birth control pills. Claims her father and sister have history of blood clots after getting COVID vaccine.  In ED: D-dimer 1057, Trop negative, EKG shows NSR  CTA showed bilateral lower lobe and lingular subsegmental pulmonary emboli and RLL infarct, 99% on RA  Started full dose Lovenox, changed to DOAC on discharge. Heme/onc [facility] consulted for family hx of clots and outpatient work up for coagulopathy.   Patient is stable on room air.  ECHO performed, will call pt cell phone for result by attending physician.   Patient is optimized for discharge home with  outpatient follow up.   Please refer to medical records/progress notes for in depth hospital course.     Attending Addendum:  Echocardiogram reviewed and discussed with cardiology. No right heart strain.  Called patient to discuss echo results and urine culture which resulted with E. coli post discharge. Patient asymptomatic. UA showed epithelial cells - not reflective of a clean catch. Patient informed of signs and symptoms of UTI and given call back number. Instructed to call if any symptoms develop. Otherwise, no need for antibiotics at this time.

## 2022-03-28 NOTE — DISCHARGE NOTE PROVIDER - NSDCMRMEDTOKEN_GEN_ALL_CORE_FT
Eliquis 5 mg oral tablet: 2 tab(s) orally 2 times a day (10mg twice a day for 7 days then continue 5mg twice a day )  Eliquis 5 mg oral tablet: 1 tab(s) orally 2 times a day   SPIRONOLACTONE 25MG TAB:   TRI-LO-DEREK DEREK TAB:

## 2022-03-28 NOTE — PATIENT PROFILE ADULT - CAREGIVER PHONE NUMBER
Problem: Falls - Risk of 
Goal: *Absence of Falls Document Yuki End Fall Risk and appropriate interventions in the flowsheet. Outcome: Progressing Towards Goal 
Fall Risk Interventions: 
Mobility Interventions: Bed/chair exit alarm Medication Interventions: Assess postural VS orthostatic hypotension Elimination Interventions: Call light in reach, Toilet paper/wipes in reach, Toileting schedule/hourly rounds Problem: Pressure Injury - Risk of 
Goal: *Prevention of pressure injury Document Gee Scale and appropriate interventions in the flowsheet. Outcome: Progressing Towards Goal 
Pressure Injury Interventions: 
Sensory Interventions: Assess changes in LOC Moisture Interventions: Absorbent underpads, Apply protective barrier, creams and emollients Activity Interventions: PT/OT evaluation, Pressure redistribution bed/mattress(bed type), Increase time out of bed Mobility Interventions: PT/OT evaluation, Pressure redistribution bed/mattress (bed type), HOB 30 degrees or less Nutrition Interventions: Document food/fluid/supplement intake Friction and Shear Interventions: Apply protective barrier, creams and emollients, HOB 30 degrees or less, Foam dressings/transparent film/skin sealants [phone]

## 2022-03-28 NOTE — DISCHARGE NOTE PROVIDER - NSDCCPCAREPLAN_GEN_ALL_CORE_FT
PRINCIPAL DISCHARGE DIAGNOSIS  Diagnosis: Pulmonary embolism  Assessment and Plan of Treatment: Seen by heme/oncology for pulmonary embolism.   CT angiogram chest shows both subsegmental pulmonary embolism, right lower lobe infarct, No sign of right heart strain.  Echocardiogram resulted   Started full dose of lovenox injection.   continue your anticoagulation orally- Eliquis 10mg twice a day x 7 days then continue 5mg twice a day.   Follow up with your health care provider within one week. Call for appointment- with hematology [facility].   If you develop shortness of breath or if your shortness of breath worsens call your Health Care Provider or go to the Emergency Department.  consider alternate forms of birth control.         SECONDARY DISCHARGE DIAGNOSES  Diagnosis: PCOS (polycystic ovarian syndrome)  Assessment and Plan of Treatment: continue home medication. Follow up with your primary MD after discharge.     PRINCIPAL DISCHARGE DIAGNOSIS  Diagnosis: Pulmonary embolism  Assessment and Plan of Treatment: Seen by heme/oncology for pulmonary embolism.   CT angiogram chest shows both subsegmental pulmonary embolism, right lower lobe infarct, No sign of right heart strain.  Echocardiogram performed, will call you for the result after discharge.   Started full dose of lovenox injection.   continue your anticoagulation orally- Eliquis 10mg twice a day x 7 days then continue 5mg twice a day.   Follow up with your health care provider within one week. Call for appointment- with hematology [facility].   If you develop shortness of breath or if your shortness of breath worsens call your Health Care Provider or go to the Emergency Department.  consider alternate forms of birth control and follow up with OBGYN for alternative medication.         SECONDARY DISCHARGE DIAGNOSES  Diagnosis: PCOS (polycystic ovarian syndrome)  Assessment and Plan of Treatment: continue home medication. Follow up with your primary MD after discharge.

## 2022-03-28 NOTE — DISCHARGE NOTE PROVIDER - NSFOLLOWUPCLINICS_GEN_ALL_ED_FT
[facility]  Internal Medicine  [address] [place] [address]  Phone: [phone]  Fax: [phone]  Follow Up Time: 2 weeks

## 2022-03-28 NOTE — PATIENT PROFILE ADULT - FALL HARM RISK - UNIVERSAL INTERVENTIONS
Bed in lowest position, wheels locked, appropriate side rails in place/Call bell, personal items and telephone in reach/Instruct patient to call for assistance before getting out of bed or chair/Non-slip footwear when patient is out of bed/[place] to call system/Physically safe environment - no spills, clutter or unnecessary equipment/Purposeful Proactive Rounding/Room/bathroom lighting operational, light cord in reach

## 2022-03-28 NOTE — DISCHARGE NOTE PROVIDER - PROVIDER TOKENS
FREE:[LAST:[Hematology [facility]],PHONE:[phone]],FAX:[(   )    -],FOLLOWUP:[1 week]],FREE:[LAST:[PCP],PHONE:[(   )    -],FAX:[(   )    -],FOLLOWUP:[Routine]]

## 2022-03-28 NOTE — CONSULT NOTE ADULT - NS ATTEND AMEND GEN_ALL_CORE FT
I have examined the patient at bedside and reviewed patient's data and participated in the management of the patient along with Ольга WILLIAMSON as well as hemotology/med oncology faculty consisting of Dr. Jeremiah Wynn, Dr. DIANNA Ortiz, Dr. DIANNA Carlson, Dr. Anjali Barron, Dr. Paulina Siddiqui, Dr. Sebas Arriaga as well as myself during the daily heme/onc case review. I reviewed pertinent clinical information, PE,  labs as well as A/P as outline above.     VSS  PE as above  NC/AT; A and O x 3  HEENT: MMM; PERRLA:  EOMI  supple  CTA b/l no W  Nl S1 S2 RR  Ab soft; no CCE  warm and dry    LABS and scans reviewed    #PE - likely provoked in setting of active OCP use and recent COVID19 infection; pt also has family history of DVT; pt stable for outpt f/up with DOAC as outpt  -pt will need AC for at least 6 months or longer  -will complete hypercoagulalbe work up    Zackary Strickland MD

## 2022-03-28 NOTE — DISCHARGE NOTE PROVIDER - CARE PROVIDER_API CALL
[facility],   Phone: [phone]  Fax: (   )    -  Follow Up Time: 1 week    PCP,   Phone: (   )    -  Fax: (   )    -  Follow Up Time: Routine

## 2022-04-04 RX ORDER — APIXABAN 2.5 MG/1
1 TABLET, FILM COATED ORAL
Qty: 60 | Refills: 0
Start: 2022-04-04 | End: 2022-05-03

## 2022-09-19 NOTE — CHART NOTE - NSCHARTNOTEFT_GEN_A_CORE
Patient seen and examined.  Patient reports feeling a little better. Still some SOB with exertion. No current CP. Had HA earlier which resolved.  See d/c note for full history/hospital course.  T(C): 36.3 (03-28-22 @ 16:15), Max: 36.8 (03-27-22 @ 19:22)  HR: 78 (03-28-22 @ 16:15) (62 - 83)  BP: 124/81 (03-28-22 @ 16:15) (109/67 - 145/75)  RR: 18 (03-28-22 @ 16:15) (16 - 18)  SpO2: 98% (03-28-22 @ 16:15) (94% - 100%)    CONSTITUTIONAL: Well groomed, no apparent distress    HEENT: EOMI, No conjunctival or scleral injection, non-icteric, MMM    RESPIRATORY: No respiratory distress, no use of accessory muscles; CTA b/l, no wheezes, rales or rhonchi,    CARDIOVASCULAR: RRR, +S1S2, no murmurs, no rubs, no gallops; no JVD; no peripheral edema    GASTROINTESTINAL: BS +, Soft, non tender, non distended, no rebound, no guarding; No palpable masses    SKIN: No rashes or ulcers noted;     PSYCHIATRIC: Appropriate insight/judgment; A+O x 3    41yoF presenting with b/l PE.  PE - on OCPs. Appreciate Heme eval. patient with FHx of thrombosis in father and sister (at early age). Patient to f/u with Heme as outpatient for hypercoagulable w/u.       - on lovenox. Change to apixaban (dosing explained to patient)       - patient to f/u with gynecologist to discuss OCPs (on for PCOS).       - TTE done to assess pulm pressures. Will f/u results  d/c home today  D/C time: 35 minutes   Plan d/w patient  d/w NP d/w Jeromy
High Risk (score 12 or above)

## 2023-12-18 ENCOUNTER — NON-APPOINTMENT (OUTPATIENT)
Age: 43
End: 2023-12-18

## 2024-01-21 ENCOUNTER — NON-APPOINTMENT (OUTPATIENT)
Age: 44
End: 2024-01-21

## 2024-01-23 ENCOUNTER — NON-APPOINTMENT (OUTPATIENT)
Age: 44
End: 2024-01-23

## 2024-01-25 ENCOUNTER — EMERGENCY (EMERGENCY)
Facility: HOSPITAL | Age: 44
LOS: 1 days | Discharge: HOME | End: 2024-01-25
Attending: EMERGENCY MEDICINE | Admitting: EMERGENCY MEDICINE
Payer: COMMERCIAL

## 2024-01-25 VITALS
RESPIRATION RATE: 16 BRPM | HEART RATE: 81 BPM | SYSTOLIC BLOOD PRESSURE: 105 MMHG | DIASTOLIC BLOOD PRESSURE: 72 MMHG | OXYGEN SATURATION: 100 % | TEMPERATURE: 98 F

## 2024-01-25 VITALS
HEART RATE: 108 BPM | SYSTOLIC BLOOD PRESSURE: 130 MMHG | WEIGHT: 160.06 LBS | OXYGEN SATURATION: 96 % | DIASTOLIC BLOOD PRESSURE: 86 MMHG | TEMPERATURE: 98 F | RESPIRATION RATE: 19 BRPM

## 2024-01-25 DIAGNOSIS — U07.1 COVID-19: ICD-10-CM

## 2024-01-25 DIAGNOSIS — Z90.49 ACQUIRED ABSENCE OF OTHER SPECIFIED PARTS OF DIGESTIVE TRACT: Chronic | ICD-10-CM

## 2024-01-25 DIAGNOSIS — D69.6 THROMBOCYTOPENIA, UNSPECIFIED: ICD-10-CM

## 2024-01-25 DIAGNOSIS — R07.89 OTHER CHEST PAIN: ICD-10-CM

## 2024-01-25 LAB
ALBUMIN SERPL ELPH-MCNC: 3.3 G/DL — LOW (ref 3.4–5)
ALP SERPL-CCNC: 29 U/L — LOW (ref 40–120)
ALT FLD-CCNC: 10 U/L — LOW (ref 12–42)
ANION GAP SERPL CALC-SCNC: 6 MMOL/L — LOW (ref 9–16)
AST SERPL-CCNC: 24 U/L — SIGNIFICANT CHANGE UP (ref 15–37)
BASOPHILS # BLD AUTO: 0.02 K/UL — SIGNIFICANT CHANGE UP (ref 0–0.2)
BASOPHILS NFR BLD AUTO: 0.4 % — SIGNIFICANT CHANGE UP (ref 0–2)
BILIRUB SERPL-MCNC: 1.4 MG/DL — HIGH (ref 0.2–1.2)
BUN SERPL-MCNC: 13 MG/DL — SIGNIFICANT CHANGE UP (ref 7–23)
CALCIUM SERPL-MCNC: 9.1 MG/DL — SIGNIFICANT CHANGE UP (ref 8.5–10.5)
CHLORIDE SERPL-SCNC: 102 MMOL/L — SIGNIFICANT CHANGE UP (ref 96–108)
CO2 SERPL-SCNC: 28 MMOL/L — SIGNIFICANT CHANGE UP (ref 22–31)
CREAT SERPL-MCNC: 0.87 MG/DL — SIGNIFICANT CHANGE UP (ref 0.5–1.3)
D DIMER BLD IA.RAPID-MCNC: <187 NG/ML DDU — SIGNIFICANT CHANGE UP
EGFR: 85 ML/MIN/1.73M2 — SIGNIFICANT CHANGE UP
EOSINOPHIL # BLD AUTO: 0.08 K/UL — SIGNIFICANT CHANGE UP (ref 0–0.5)
EOSINOPHIL NFR BLD AUTO: 1.8 % — SIGNIFICANT CHANGE UP (ref 0–6)
GLUCOSE SERPL-MCNC: 94 MG/DL — SIGNIFICANT CHANGE UP (ref 70–99)
HCG SERPL-ACNC: <1 MIU/ML — SIGNIFICANT CHANGE UP
HCT VFR BLD CALC: 36.6 % — SIGNIFICANT CHANGE UP (ref 34.5–45)
HGB BLD-MCNC: 12 G/DL — SIGNIFICANT CHANGE UP (ref 11.5–15.5)
IMM GRANULOCYTES NFR BLD AUTO: 0.2 % — SIGNIFICANT CHANGE UP (ref 0–0.9)
LYMPHOCYTES # BLD AUTO: 0.85 K/UL — LOW (ref 1–3.3)
LYMPHOCYTES # BLD AUTO: 18.7 % — SIGNIFICANT CHANGE UP (ref 13–44)
MCHC RBC-ENTMCNC: 29.9 PG — SIGNIFICANT CHANGE UP (ref 27–34)
MCHC RBC-ENTMCNC: 32.8 GM/DL — SIGNIFICANT CHANGE UP (ref 32–36)
MCV RBC AUTO: 91.3 FL — SIGNIFICANT CHANGE UP (ref 80–100)
MONOCYTES # BLD AUTO: 0.54 K/UL — SIGNIFICANT CHANGE UP (ref 0–0.9)
MONOCYTES NFR BLD AUTO: 11.9 % — SIGNIFICANT CHANGE UP (ref 2–14)
NEUTROPHILS # BLD AUTO: 3.05 K/UL — SIGNIFICANT CHANGE UP (ref 1.8–7.4)
NEUTROPHILS NFR BLD AUTO: 67 % — SIGNIFICANT CHANGE UP (ref 43–77)
NRBC # BLD: 0 /100 WBCS — SIGNIFICANT CHANGE UP (ref 0–0)
NT-PROBNP SERPL-SCNC: 86 PG/ML — SIGNIFICANT CHANGE UP
PLATELET # BLD AUTO: 113 K/UL — LOW (ref 150–400)
POTASSIUM SERPL-MCNC: 3.8 MMOL/L — SIGNIFICANT CHANGE UP (ref 3.5–5.3)
POTASSIUM SERPL-SCNC: 3.8 MMOL/L — SIGNIFICANT CHANGE UP (ref 3.5–5.3)
PROT SERPL-MCNC: 7.6 G/DL — SIGNIFICANT CHANGE UP (ref 6.4–8.2)
RBC # BLD: 4.01 M/UL — SIGNIFICANT CHANGE UP (ref 3.8–5.2)
RBC # FLD: 12.4 % — SIGNIFICANT CHANGE UP (ref 10.3–14.5)
SODIUM SERPL-SCNC: 136 MMOL/L — SIGNIFICANT CHANGE UP (ref 132–145)
TROPONIN I, HIGH SENSITIVITY RESULT: <4 NG/L — SIGNIFICANT CHANGE UP
WBC # BLD: 4.55 K/UL — SIGNIFICANT CHANGE UP (ref 3.8–10.5)
WBC # FLD AUTO: 4.55 K/UL — SIGNIFICANT CHANGE UP (ref 3.8–10.5)

## 2024-01-25 PROCEDURE — 71045 X-RAY EXAM CHEST 1 VIEW: CPT | Mod: 26

## 2024-01-25 PROCEDURE — 99285 EMERGENCY DEPT VISIT HI MDM: CPT

## 2024-01-25 NOTE — ED PROVIDER NOTE - CARE PROVIDER_API CALL
Satish Olivo [facility]  [address], [place] [address]  Phone: [phone]  Fax: [phone]  Follow Up Time: 7-10 Days    Eva Oliveros  Pulmonary Disease  [address] [place] [id number]  Phone: [phone]  Fax: [phone]  Follow Up Time: 7-10 Days

## 2024-01-25 NOTE — ED ADULT TRIAGE NOTE - CHIEF COMPLAINT QUOTE
tested positive for COVID on 1/22/24 sent by MD for r/o PE- c/o cough since monday- pt also reports right sided rib pain- states the last time she had covid she had a PE

## 2024-01-25 NOTE — ED PROVIDER NOTE - OBJECTIVE STATEMENT
23-year-old history of blood clots, prior COVID infection.  Here with complaint of COVID-positive test and right-sided chest discomfort similar to prior episodes of PE.  No shortness of breath no dyspnea with exertion, no decrease in exercise tolerance.  Currently not taking any blood thinners.  Has a family history of blood clots.  Does not want to do a CTA today to check for blood clots but agreeable to D-dimer and chest x-ray.  Minimal cough, COVID symptoms appear to be mild compared to prior episodes of illness.

## 2024-01-25 NOTE — ED ADULT NURSE NOTE - CAS EDP DISCH TYPE
"Anesthesia Transfer of Care Note    Patient: Lidia Cochran    Procedure(s) Performed: Procedure(s) (LRB):  ULTRASOUND, UPPER GI TRACT, ENDOSCOPIC (N/A)    Patient location: PACU    Anesthesia Type: general    Transport from OR: Transported from OR on room air with adequate spontaneous ventilation    Post pain: adequate analgesia    Post assessment: no apparent anesthetic complications    Post vital signs: stable    Level of consciousness: awake, alert and oriented    Nausea/Vomiting: no nausea/vomiting    Complications: none    Transfer of care protocol was followed      Last vitals:   Visit Vitals  BP (!) 143/67   Pulse 60   Temp 36.9 °C (98.4 °F)   Resp 15   Ht 5' 2" (1.575 m)   Wt 70.3 kg (155 lb)   SpO2 99%   Breastfeeding? No   BMI 28.35 kg/m²     "
Home

## 2024-01-25 NOTE — ED PROVIDER NOTE - CLINICAL SUMMARY MEDICAL DECISION MAKING FREE TEXT BOX
COVID-19 positive infection with normal-appearing chest x-ray, negative D-dimer.  Patient elected not to do CTA, she will return for worsening symptoms if her symptoms get worse.  Very low risk for PE given negative D-dimer.  Chest x-ray with no acute infiltrate or effusions.  Noted mild thrombocytopenia, likely due to concurrent viral infection.  Will DC home, outpatient follow-up.

## 2024-01-25 NOTE — ED PROVIDER NOTE - PROVIDER TOKENS
PROVIDER:[TOKEN:[[id number]],FOLLOWUP:[7-10 Days]],PROVIDER:[TOKEN:[[id number]],FOLLOWUP:[7-10 Days]]

## 2024-01-25 NOTE — ED PROVIDER NOTE - CARE PROVIDERS DIRECT ADDRESSES
[email or web address]

## 2024-01-25 NOTE — ED PROVIDER NOTE - PHYSICAL EXAMINATION
VSS in NAD non toxic appearing   NCAT EOMI PERRL OP clear  heart RRR no murmur   lungs CTA no wheezing no rales no rhonchi   normal neuro exam CN I-XII grossly intact, no groos motor or sensory deficits  no peripheral c/c/e

## 2024-01-25 NOTE — ED ADULT NURSE NOTE - NSFALLUNIVINTERV_ED_ALL_ED
Bed/Stretcher in lowest position, wheels locked, appropriate side rails in place/Call bell, personal items and telephone in reach/Instruct patient to call for assistance before getting out of bed/chair/stretcher/Non-slip footwear applied when patient is off stretcher/[place] to call system/Physically safe environment - no spills, clutter or unnecessary equipment/Purposeful proactive rounding/Room/bathroom lighting operational, light cord in reach

## 2024-01-25 NOTE — ED PROVIDER NOTE - RAPID ASSESSMENT
This patient was not seen or examined by me; however, an initial set of orders were initiated to expedite care and workup.

## 2024-01-25 NOTE — ED ADULT NURSE REASSESSMENT NOTE - NS ED NURSE REASSESS COMMENT FT1
Received handoff from Idania Chacon RN. patient in no acute distress. Patient pending x-ray. Care continues.

## 2024-01-25 NOTE — ED PROVIDER NOTE - PATIENT PORTAL LINK FT
You can access the FollowMyHealth Patient Portal offered by [facility] by registering at the following website: [email or web address]. By joining [facility]’s FollowMyHealth portal, you will also be able to view your health information using other applications (apps) compatible with our system.

## 2024-01-25 NOTE — ED PROVIDER NOTE - NSFOLLOWUPINSTRUCTIONS_ED_ALL_ED_FT
You may have Coronavirus, or any of the other many colds that are going around now.     COVID-19 testing are currently being prioritized at [facility] for admitted patients.     All patients that are stable are being discharged from the ED, even if there is a concern for coronavirus. Since you are stable, you are being discharged. Your test results may take 5-7 days. You will get a phone call for postive results. Not for negative results.  Please check the patient online portal for results. Please follow the instructions on provided coronavirus discharge educational forms and self quarantine for 14 days.     In addition, you have been placed on our surveillance tracker. Return to the ED immediately if you have shortness of breath, fever, pain, weakness, vomiting any concerns.    1. STAY HOME for 14 DAYS  2. Minimize Human contact to ONLY ESSENTIAL  3. Every time you wash your hands, sing the HAPPY BIRTHDAY Song so you know you're washing long enough.  Make sure to scrub the webspace between your fingers.  4. DRINK 1-3 Liters of fluids day x at least 5 days.  To remain hydrated. Your fatigue, lightheadedness, and body aches will decrease and your fever has a better chance of breaking if you are well hydrated.    5. For your Fever and Body aches takes Tylenol 650-100mg every 4-6h (max 4000mg/day). Try not to use ibuprofen, aspirin or naproxen (Advil, Motrin or Aleve) as these may worsen Coronavirus infection.  6. Use an inhaler for mild shortness of breath and cough  7. RETURN TO THE ER IMMEDIATELY IF YOU HAVE WORSENING SHORTNESS OF BREATH  8. TAKE THE FOLLOWING SUPPLEMENTS DAILY.        VITAMIN C 1000MG ONCE DAILY.        VITAMIN D 200IU ONCE DAILY.        ZINC 50MG ONCE DAILY.    Monoclonal antibody treatment is only available for people who have tested positive for COVID-19 and have symptoms like fever, cough, trouble breathing, loss of taste or smell, body aches, or headache.    To see if you qualify, use the link below to access the screening tool and simply answer a few questions.    Monoclonal Antibody Eligibility Screening Tool:  [email or web address].[email or web address]    Oral antiviral pills: These pills — taken daily for five days — help stop the virus from replicating. This reduces the amount of virus in your body. The FDA has authorized two pills to treat COVID-19 — paxlovid and molnupiravir.    Ask your doctor if you are eligible.

## 2024-02-05 PROBLEM — Z00.00 ENCOUNTER FOR PREVENTIVE HEALTH EXAMINATION: Status: ACTIVE | Noted: 2024-02-05

## 2024-02-12 ENCOUNTER — TRANSCRIPTION ENCOUNTER (OUTPATIENT)
Age: 44
End: 2024-02-12

## 2024-02-12 ENCOUNTER — APPOINTMENT (OUTPATIENT)
Dept: PULMONOLOGY | Facility: CLINIC | Age: 44
End: 2024-02-12
Payer: COMMERCIAL

## 2024-02-12 VITALS
WEIGHT: 160 LBS | BODY MASS INDEX: 24.25 KG/M2 | OXYGEN SATURATION: 100 % | SYSTOLIC BLOOD PRESSURE: 110 MMHG | HEIGHT: 68 IN | RESPIRATION RATE: 16 BRPM | TEMPERATURE: 98.2 F | DIASTOLIC BLOOD PRESSURE: 70 MMHG | HEART RATE: 84 BPM

## 2024-02-12 DIAGNOSIS — R05.3 CHRONIC COUGH: ICD-10-CM

## 2024-02-12 PROCEDURE — 99203 OFFICE O/P NEW LOW 30 MIN: CPT | Mod: 25

## 2024-02-12 PROCEDURE — 94010 BREATHING CAPACITY TEST: CPT

## 2024-02-12 NOTE — ASSESSMENT
[FreeTextEntry1] : 42 y/o female presents to clinic for follow up after hospital discharge. She feels better and has progressively improved over the last several weeks. No further treatment necessary and would recommend just symptomatic management and slow return to activity.   F/u with Dr. Leyla moreira

## 2024-02-12 NOTE — HISTORY OF PRESENT ILLNESS
[Never] : never [TextBox_4] : 42 y/o woman with PMH of PCOS and PE 2/2 COVID who presents for follow up after ED discharge. She had back to back to back URI viral infections since Thanksgiving and she went to the ED in late January after she was found to be COVID positive. CXR, d dimer, blood work was all within normal limits so she was discharged with symptomatic care. She says since then she has progressively improved. She overall feels well. She still reports some SOB when exercising but it is improving.

## 2024-02-23 RX ORDER — SPIRONOLACTONE 25 MG/1
0 TABLET, FILM COATED ORAL
Qty: 0 | Refills: 0 | DISCHARGE

## 2024-02-23 RX ORDER — APIXABAN 2.5 MG/1
10 TABLET, FILM COATED ORAL
Qty: 0 | Refills: 0 | DISCHARGE

## 2024-02-23 RX ORDER — NORGESTIMATE AND ETHINYL ESTRADIOL 7DAYSX3 LO
0 KIT ORAL
Qty: 0 | Refills: 0 | DISCHARGE

## 2024-02-23 RX ORDER — APIXABAN 2.5 MG/1
1 TABLET, FILM COATED ORAL
Qty: 0 | Refills: 0 | DISCHARGE

## 2024-03-20 ENCOUNTER — APPOINTMENT (OUTPATIENT)
Dept: RHEUMATOLOGY | Facility: CLINIC | Age: 44
End: 2024-03-20
Payer: COMMERCIAL

## 2024-03-20 ENCOUNTER — LABORATORY RESULT (OUTPATIENT)
Age: 44
End: 2024-03-20

## 2024-03-20 VITALS
HEART RATE: 89 BPM | OXYGEN SATURATION: 98 % | BODY MASS INDEX: 23.95 KG/M2 | DIASTOLIC BLOOD PRESSURE: 78 MMHG | HEIGHT: 68 IN | SYSTOLIC BLOOD PRESSURE: 135 MMHG | RESPIRATION RATE: 15 BRPM | WEIGHT: 158 LBS | TEMPERATURE: 98.7 F

## 2024-03-20 DIAGNOSIS — R76.8 OTHER SPECIFIED ABNORMAL IMMUNOLOGICAL FINDINGS IN SERUM: ICD-10-CM

## 2024-03-20 DIAGNOSIS — Z78.9 OTHER SPECIFIED HEALTH STATUS: ICD-10-CM

## 2024-03-20 DIAGNOSIS — Z87.898 PERSONAL HISTORY OF OTHER SPECIFIED CONDITIONS: ICD-10-CM

## 2024-03-20 DIAGNOSIS — H04.123 DRY EYE SYNDROME OF BILATERAL LACRIMAL GLANDS: ICD-10-CM

## 2024-03-20 DIAGNOSIS — Z80.3 FAMILY HISTORY OF MALIGNANT NEOPLASM OF BREAST: ICD-10-CM

## 2024-03-20 PROCEDURE — 99204 OFFICE O/P NEW MOD 45 MIN: CPT

## 2024-03-20 RX ORDER — SPIRONOLACTONE 100 MG/1
100 TABLET ORAL
Refills: 0 | Status: ACTIVE | COMMUNITY
Start: 2024-03-20

## 2024-03-20 NOTE — ASSESSMENT
[FreeTextEntry1] : 42 y/o F w reported elevated CARSON =reported elevated CARSON =SICCA symptoms w dry eyes, dry mouth, vaginal dryness =arthralgais in shoulders, hips, worse in am; stiffness =fatigue =CP, pleuritic?  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   ddx SLE vs Sjogren vs RA vs systemic sclerosis Will also send APLS serologies, given unsure if caused by COVID 19 and famhx  Plan -Labs w serologies, inflammatory markers RTO depending on above results

## 2024-03-20 NOTE — PHYSICAL EXAM
[General Appearance - Well Nourished] : well nourished [General Appearance - Well Developed] : well developed [Sclera] : the sclera and conjunctiva were normal [Auscultation Breath Sounds / Voice Sounds] : lungs were clear to auscultation bilaterally [Heart Rate And Rhythm] : heart rate was normal and rhythm regular [Murmurs] : no murmurs [Heart Sounds] : normal S1 and S2 [Abdomen Soft] : soft [Abdomen Tenderness] : non-tender [Cervical Lymph Nodes Enlarged Posterior Bilaterally] : posterior cervical [Supraclavicular Lymph Nodes Enlarged Bilaterally] : supraclavicular [Cervical Lymph Nodes Enlarged Anterior Bilaterally] : anterior cervical [] : no rash [Musculoskeletal - Swelling] : no joint swelling seen [Skin Lesions] : no skin lesions [Oriented To Time, Place, And Person] : oriented to person, place, and time

## 2024-03-20 NOTE — CONSULT LETTER
[Dear  ___] : Dear  [unfilled], [Consult Closing:] : Thank you very much for allowing me to participate in the care of this patient.  If you have any questions, please do not hesitate to contact me. [Consult Letter:] : I had the pleasure of evaluating your patient, [unfilled]. [Sincerely,] : Sincerely, [FreeTextEntry3] : Pedro Grace MD [FreeTextEntry2] : Dr. Zackary Strickland [id number] [facility] [address],  [place] [id number]

## 2024-03-20 NOTE — HISTORY OF PRESENT ILLNESS
[FreeTextEntry1] : 42 y/o F here for consultation   Pt had COVID19 in 12/21 and 3 months after had PE.   Pt has had CARSON in past that is elevated, but have not found reason for this.   Pt says she has had dryness since she had PE. Pt had dry eyes, dry mouth, and vaginal dryness. Pt using topical medications.  Reports hair loss. Gets scalp injections.  Pt says in 12/23, pt developed R shoulder pain. Pt says she developed in L shoulder. Sometimes gets hips pain. Pt says pain is worse in am.  Pt reports stiffness, but no swelling.  Difficulty doing activities given energy and after has worse joint pain.  Pt has taken ibuprofen, but it does not help.   No fevers, h/a, rashes, hair loss, oral ulcers, epistaxis, sinusitis,  swollen glands, SOB, vision changes, abdominal pain, GERD, n/v, blood in stool or urine, focal weakness, sensory loss,   weight loss.  +migraines  +fatigue  +Has pain in chest when taking deep breaths.  +Pt taking vitamin D 10K daily, zinc.  +pt had persistent cough in early 2024 +hemorrhoids, w constipation and diarrhea; had colonoscopy which pt reports normal; now resolving  +pt reports white discoloration of toes   OB: no hx of miscarriage; no hx of preeclampsia

## 2024-03-28 ENCOUNTER — APPOINTMENT (OUTPATIENT)
Dept: RHEUMATOLOGY | Facility: CLINIC | Age: 44
End: 2024-03-28
Payer: COMMERCIAL

## 2024-03-28 LAB
ALBUMIN SERPL ELPH-MCNC: 4.6 G/DL
ALP BLD-CCNC: 40 U/L
ALT SERPL-CCNC: 12 U/L
ANA PAT FLD IF-IMP: ABNORMAL
ANA SER IF-ACNC: ABNORMAL
ANION GAP SERPL CALC-SCNC: 12 MMOL/L
APPEARANCE: CLEAR
AST SERPL-CCNC: 17 U/L
B2 GLYCOPROT1 IGA SERPL IA-ACNC: <5 SAU
B2 GLYCOPROT1 IGG SER-ACNC: <5 SGU
B2 GLYCOPROT1 IGM SER-ACNC: <5 SMU
BACTERIA: NEGATIVE /HPF
BASOPHILS # BLD AUTO: 0.02 K/UL
BASOPHILS NFR BLD AUTO: 0.5 %
BILIRUB SERPL-MCNC: 0.9 MG/DL
BILIRUBIN URINE: NEGATIVE
BLOOD URINE: NEGATIVE
BUN SERPL-MCNC: 12 MG/DL
C3 SERPL-MCNC: 94 MG/DL
C4 SERPL-MCNC: 13 MG/DL
CALCIUM SERPL-MCNC: 9.3 MG/DL
CARDIOLIPIN IGM SER-MCNC: 7.7 MPL
CARDIOLIPIN IGM SER-MCNC: <5 GPL
CAST: 0 /LPF
CCP AB SER IA-ACNC: 8 UNITS
CHLORIDE SERPL-SCNC: 102 MMOL/L
CK SERPL-CCNC: 43 U/L
CO2 SERPL-SCNC: 25 MMOL/L
COLOR: YELLOW
CREAT SERPL-MCNC: 0.83 MG/DL
CREAT SPEC-SCNC: 20 MG/DL
CREAT/PROT UR: 0.2 RATIO
CRP SERPL-MCNC: <3 MG/L
DEPRECATED CARDIOLIPIN IGA SER: <5 APL
DSDNA AB SER-ACNC: 36 IU/ML
EGFR: 90 ML/MIN/1.73M2
ENA RNP AB SER IA-ACNC: 0.6 AL
ENA SM AB SER IA-ACNC: 0.5 AL
ENA SS-A AB SER IA-ACNC: >8 AL
ENA SS-B AB SER IA-ACNC: >8 AL
EOSINOPHIL # BLD AUTO: 0.09 K/UL
EOSINOPHIL NFR BLD AUTO: 2.2 %
EPITHELIAL CELLS: 1 /HPF
ERYTHROCYTE [SEDIMENTATION RATE] IN BLOOD BY WESTERGREN METHOD: 22 MM/HR
G6PD SER-CCNC: 13 U/G HGB
GLUCOSE QUALITATIVE U: NEGATIVE MG/DL
GLUCOSE SERPL-MCNC: 93 MG/DL
HCT VFR BLD CALC: 39.4 %
HGB BLD-MCNC: 12.3 G/DL
IMM GRANULOCYTES NFR BLD AUTO: 0.2 %
KETONES URINE: NEGATIVE MG/DL
LEUKOCYTE ESTERASE URINE: NEGATIVE
LYMPHOCYTES # BLD AUTO: 0.84 K/UL
LYMPHOCYTES NFR BLD AUTO: 20.6 %
MAN DIFF?: NORMAL
MCHC RBC-ENTMCNC: 29.6 PG
MCHC RBC-ENTMCNC: 31.2 GM/DL
MCV RBC AUTO: 94.9 FL
MICROSCOPIC-UA: NORMAL
MONOCYTES # BLD AUTO: 0.42 K/UL
MONOCYTES NFR BLD AUTO: 10.3 %
NEUTROPHILS # BLD AUTO: 2.69 K/UL
NEUTROPHILS NFR BLD AUTO: 66.2 %
NITRITE URINE: NEGATIVE
PH URINE: 6.5
PLATELET # BLD AUTO: 128 K/UL
POTASSIUM SERPL-SCNC: 3.8 MMOL/L
PROT SERPL-MCNC: 7.9 G/DL
PROT UR-MCNC: 5 MG/DL
PROTEIN URINE: NEGATIVE MG/DL
RBC # BLD: 4.15 M/UL
RBC # FLD: 13.2 %
RED BLOOD CELLS URINE: 0 /HPF
RF+CCP IGG SER-IMP: NEGATIVE
RHEUMATOID FACT SER QL: 78 IU/ML
SODIUM SERPL-SCNC: 138 MMOL/L
SPECIFIC GRAVITY URINE: 1.01
UROBILINOGEN URINE: 0.2 MG/DL
WBC # FLD AUTO: 4.07 K/UL
WHITE BLOOD CELLS URINE: 1 /HPF

## 2024-03-28 PROCEDURE — 99442: CPT

## 2024-03-28 RX ORDER — HYDROXYCHLOROQUINE SULFATE 200 MG/1
200 TABLET, FILM COATED ORAL
Qty: 45 | Refills: 2 | Status: ACTIVE | COMMUNITY
Start: 2024-03-28 | End: 1900-01-01

## 2024-04-05 LAB
CENP-A: <11 SI
CENP-B: <11 SI
FIBRILLARIN: <11 SI
PM/SCL-100: <11 SI
PM/SCL-75: <11 SI
RP11: <11 SI
RP155: <11 SI
SCL-70: <11 SI
TH/TO: <11 SI
U1-SNRNP RNP A: <11 SI
U1-SNRNP RNP C: <11 SI
U1-SNRNP RNP-70KD: <11 SI

## 2024-04-08 PROBLEM — E28.2 POLYCYSTIC OVARIAN SYNDROME: Chronic | Status: ACTIVE | Noted: 2022-03-27

## 2024-04-20 ENCOUNTER — NON-APPOINTMENT (OUTPATIENT)
Age: 44
End: 2024-04-20

## 2024-06-04 ENCOUNTER — TRANSCRIPTION ENCOUNTER (OUTPATIENT)
Age: 44
End: 2024-06-04

## 2024-06-24 ENCOUNTER — APPOINTMENT (OUTPATIENT)
Dept: RHEUMATOLOGY | Facility: CLINIC | Age: 44
End: 2024-06-24
Payer: COMMERCIAL

## 2024-06-24 DIAGNOSIS — M35.00 SICCA SYNDROME, UNSPECIFIED: ICD-10-CM

## 2024-06-24 DIAGNOSIS — I73.00 RAYNAUD'S SYNDROME W/OUT GANGRENE: ICD-10-CM

## 2024-06-24 DIAGNOSIS — M25.50 PAIN IN UNSPECIFIED JOINT: ICD-10-CM

## 2024-06-24 PROCEDURE — 99442: CPT

## 2024-08-18 ENCOUNTER — NON-APPOINTMENT (OUTPATIENT)
Age: 44
End: 2024-08-18

## 2024-10-18 ENCOUNTER — NON-APPOINTMENT (OUTPATIENT)
Age: 44
End: 2024-10-18

## 2024-10-28 ENCOUNTER — NON-APPOINTMENT (OUTPATIENT)
Age: 44
End: 2024-10-28

## 2024-10-28 DIAGNOSIS — M54.9 DORSALGIA, UNSPECIFIED: ICD-10-CM

## 2024-10-28 DIAGNOSIS — M25.511 PAIN IN RIGHT SHOULDER: ICD-10-CM

## 2024-10-28 DIAGNOSIS — M25.512 PAIN IN RIGHT SHOULDER: ICD-10-CM

## 2025-03-03 ENCOUNTER — APPOINTMENT (OUTPATIENT)
Dept: RHEUMATOLOGY | Facility: CLINIC | Age: 45
End: 2025-03-03
Payer: COMMERCIAL

## 2025-03-03 VITALS
OXYGEN SATURATION: 99 % | DIASTOLIC BLOOD PRESSURE: 70 MMHG | RESPIRATION RATE: 15 BRPM | HEART RATE: 82 BPM | HEIGHT: 68 IN | TEMPERATURE: 97 F | SYSTOLIC BLOOD PRESSURE: 115 MMHG | WEIGHT: 154 LBS | BODY MASS INDEX: 23.34 KG/M2

## 2025-03-03 DIAGNOSIS — I73.00 RAYNAUD'S SYNDROME W/OUT GANGRENE: ICD-10-CM

## 2025-03-03 DIAGNOSIS — H04.123 DRY EYE SYNDROME OF BILATERAL LACRIMAL GLANDS: ICD-10-CM

## 2025-03-03 DIAGNOSIS — M35.00 SICCA SYNDROME, UNSPECIFIED: ICD-10-CM

## 2025-03-03 PROCEDURE — 99214 OFFICE O/P EST MOD 30 MIN: CPT

## 2025-03-03 PROCEDURE — G2211 COMPLEX E/M VISIT ADD ON: CPT

## 2025-03-13 LAB
ALBUMIN MFR SERPL ELPH: 58.4 %
ALBUMIN SERPL ELPH-MCNC: 4.1 G/DL
ALBUMIN SERPL-MCNC: 4.1 G/DL
ALBUMIN/GLOB SERPL: 1.4 RATIO
ALP BLD-CCNC: 39 U/L
ALPHA1 GLOB MFR SERPL ELPH: 3.1 %
ALPHA1 GLOB SERPL ELPH-MCNC: 0.2 G/DL
ALPHA2 GLOB MFR SERPL ELPH: 7.9 %
ALPHA2 GLOB SERPL ELPH-MCNC: 0.6 G/DL
ALT SERPL-CCNC: 8 U/L
ANION GAP SERPL CALC-SCNC: 7 MMOL/L
APPEARANCE: CLEAR
AST SERPL-CCNC: 14 U/L
B-GLOBULIN MFR SERPL ELPH: 8.2 %
B-GLOBULIN SERPL ELPH-MCNC: 0.6 G/DL
BACTERIA: NEGATIVE /HPF
BASOPHILS # BLD AUTO: 0.02 K/UL
BASOPHILS NFR BLD AUTO: 0.5 %
BILIRUB SERPL-MCNC: 1 MG/DL
BILIRUBIN URINE: NEGATIVE
BLOOD URINE: NEGATIVE
BUN SERPL-MCNC: 10 MG/DL
C3 SERPL-MCNC: 80 MG/DL
C4 SERPL-MCNC: 14 MG/DL
CALCIUM SERPL-MCNC: 9.3 MG/DL
CAST: 0 /LPF
CHLORIDE SERPL-SCNC: 103 MMOL/L
CK SERPL-CCNC: 42 U/L
CO2 SERPL-SCNC: 26 MMOL/L
COLOR: YELLOW
CREAT SERPL-MCNC: 0.87 MG/DL
CREAT SPEC-SCNC: 20 MG/DL
CREAT/PROT UR: NORMAL RATIO
CRP SERPL-MCNC: <3 MG/L
DEPRECATED KAPPA LC FREE/LAMBDA SER: 1.24 RATIO
DSDNA AB SER-ACNC: 2 IU/ML
EGFRCR SERPLBLD CKD-EPI 2021: 84 ML/MIN/1.73M2
EOSINOPHIL # BLD AUTO: 0.12 K/UL
EOSINOPHIL NFR BLD AUTO: 2.9 %
EPITHELIAL CELLS: 0 /HPF
ERYTHROCYTE [SEDIMENTATION RATE] IN BLOOD BY WESTERGREN METHOD: 5 MM/HR
GAMMA GLOB FLD ELPH-MCNC: 1.6 G/DL
GAMMA GLOB MFR SERPL ELPH: 22.4 %
GLUCOSE QUALITATIVE U: NEGATIVE MG/DL
GLUCOSE SERPL-MCNC: 93 MG/DL
HCT VFR BLD CALC: 36.8 %
HGB BLD-MCNC: 12 G/DL
IGA SER QL IEP: 145 MG/DL
IGG SER QL IEP: 1647 MG/DL
IGM SER QL IEP: 141 MG/DL
IMM GRANULOCYTES NFR BLD AUTO: 0 %
INTERPRETATION SERPL IEP-IMP: NORMAL
KAPPA LC CSF-MCNC: 1.45 MG/DL
KAPPA LC SERPL-MCNC: 1.8 MG/DL
KETONES URINE: NEGATIVE MG/DL
LDH SERPL-CCNC: 173 U/L
LEUKOCYTE ESTERASE URINE: NEGATIVE
LYMPHOCYTES # BLD AUTO: 0.75 K/UL
LYMPHOCYTES NFR BLD AUTO: 18 %
M PROTEIN SPEC IFE-MCNC: NORMAL
MAN DIFF?: NORMAL
MCHC RBC-ENTMCNC: 30.2 PG
MCHC RBC-ENTMCNC: 32.6 G/DL
MCV RBC AUTO: 92.7 FL
MICROSCOPIC-UA: NORMAL
MONOCYTES # BLD AUTO: 0.39 K/UL
MONOCYTES NFR BLD AUTO: 9.4 %
NEUTROPHILS # BLD AUTO: 2.88 K/UL
NEUTROPHILS NFR BLD AUTO: 69.2 %
NITRITE URINE: NEGATIVE
PH URINE: 6.5
PLATELET # BLD AUTO: 134 K/UL
POTASSIUM SERPL-SCNC: 4.2 MMOL/L
PROT SERPL-MCNC: 7 G/DL
PROT UR-MCNC: <4 MG/DL
PROTEIN URINE: NEGATIVE MG/DL
RBC # BLD: 3.97 M/UL
RBC # FLD: 12.8 %
RED BLOOD CELLS URINE: 1 /HPF
RHEUMATOID FACT SER QL: 32 IU/ML
SODIUM SERPL-SCNC: 137 MMOL/L
SPECIFIC GRAVITY URINE: 1.01
UROBILINOGEN URINE: 0.2 MG/DL
WBC # FLD AUTO: 4.16 K/UL
WHITE BLOOD CELLS URINE: 0 /HPF

## 2025-06-16 ENCOUNTER — APPOINTMENT (OUTPATIENT)
Dept: RHEUMATOLOGY | Facility: CLINIC | Age: 45
End: 2025-06-16
Payer: COMMERCIAL

## 2025-06-16 VITALS
SYSTOLIC BLOOD PRESSURE: 119 MMHG | RESPIRATION RATE: 15 BRPM | HEART RATE: 89 BPM | DIASTOLIC BLOOD PRESSURE: 79 MMHG | OXYGEN SATURATION: 98 % | WEIGHT: 151 LBS | BODY MASS INDEX: 22.88 KG/M2 | HEIGHT: 68 IN | TEMPERATURE: 97 F

## 2025-06-16 PROCEDURE — 99214 OFFICE O/P EST MOD 30 MIN: CPT

## 2025-06-16 PROCEDURE — G2211 COMPLEX E/M VISIT ADD ON: CPT
